# Patient Record
Sex: FEMALE | Race: AMERICAN INDIAN OR ALASKA NATIVE
[De-identification: names, ages, dates, MRNs, and addresses within clinical notes are randomized per-mention and may not be internally consistent; named-entity substitution may affect disease eponyms.]

---

## 2020-02-13 ENCOUNTER — HOSPITAL ENCOUNTER (OUTPATIENT)
Dept: HOSPITAL 88 - RAD | Age: 60
End: 2020-02-13
Attending: INTERNAL MEDICINE
Payer: COMMERCIAL

## 2020-02-13 DIAGNOSIS — R05: Primary | ICD-10-CM

## 2020-02-13 PROCEDURE — 71046 X-RAY EXAM CHEST 2 VIEWS: CPT

## 2020-02-13 NOTE — DIAGNOSTIC IMAGING REPORT
EXAM:  CHEST 2 VIEWS



DATE: 2/13/2020 12:02 PM  



INDICATION: Cough 



COMPARISON: None



FINDINGS/IMPRESSION:



The trachea is midline. There is mild prominence of the interstitium which is

nonspecific but can be seen in the setting of a atypical/viral infectious

process. The lungs are otherwise symmetrically expanded without evidence for

large focal consolidation, pneumothorax, or significant pleural effusion.



The cardiomediastinal silhouette and pulmonary vasculature are within normal

limits. No acute osseous abnormality is identified. The surrounding soft

tissues are unremarkable.



Signed by: Dr. Christophe Jacinto MD on 2/13/2020 12:41 PM

## 2021-12-05 ENCOUNTER — HOSPITAL ENCOUNTER (EMERGENCY)
Dept: HOSPITAL 88 - ER | Age: 61
LOS: 1 days | Discharge: HOME | End: 2021-12-06
Payer: COMMERCIAL

## 2021-12-05 VITALS — BODY MASS INDEX: 34.2 KG/M2 | HEIGHT: 63 IN | WEIGHT: 193 LBS

## 2021-12-05 DIAGNOSIS — R11.2: ICD-10-CM

## 2021-12-05 DIAGNOSIS — R10.13: Primary | ICD-10-CM

## 2021-12-05 PROCEDURE — 99284 EMERGENCY DEPT VISIT MOD MDM: CPT

## 2021-12-06 VITALS — DIASTOLIC BLOOD PRESSURE: 65 MMHG | SYSTOLIC BLOOD PRESSURE: 132 MMHG
